# Patient Record
Sex: MALE | Race: WHITE | NOT HISPANIC OR LATINO | Employment: FULL TIME | URBAN - METROPOLITAN AREA
[De-identification: names, ages, dates, MRNs, and addresses within clinical notes are randomized per-mention and may not be internally consistent; named-entity substitution may affect disease eponyms.]

---

## 2024-02-12 ENCOUNTER — LAB REQUISITION (OUTPATIENT)
Dept: LAB | Facility: HOSPITAL | Age: 24
End: 2024-02-12

## 2024-02-12 DIAGNOSIS — Z20.2 CONTACT WITH AND (SUSPECTED) EXPOSURE TO INFECTIONS WITH A PREDOMINANTLY SEXUAL MODE OF TRANSMISSION: ICD-10-CM

## 2024-09-11 ENCOUNTER — OFFICE VISIT (OUTPATIENT)
Dept: URGENT CARE | Facility: URGENT CARE | Age: 24
End: 2024-09-11
Payer: MEDICAID

## 2024-09-11 VITALS
DIASTOLIC BLOOD PRESSURE: 79 MMHG | BODY MASS INDEX: 21.91 KG/M2 | OXYGEN SATURATION: 96 % | TEMPERATURE: 98.9 F | HEART RATE: 97 BPM | SYSTOLIC BLOOD PRESSURE: 125 MMHG | WEIGHT: 166.01 LBS | RESPIRATION RATE: 18 BRPM

## 2024-09-11 DIAGNOSIS — R51.9 NONINTRACTABLE HEADACHE, UNSPECIFIED CHRONICITY PATTERN, UNSPECIFIED HEADACHE TYPE: ICD-10-CM

## 2024-09-11 DIAGNOSIS — R68.83 CHILLS: ICD-10-CM

## 2024-09-11 DIAGNOSIS — J06.9 VIRAL URI: ICD-10-CM

## 2024-09-11 DIAGNOSIS — J39.9 UPPER RESPIRATORY DISEASE: Primary | ICD-10-CM

## 2024-09-11 LAB — POC SARS-COV-2 AG BINAX: NORMAL

## 2024-09-11 RX ORDER — IBUPROFEN 600 MG/1
600 TABLET ORAL EVERY 6 HOURS PRN
Qty: 30 TABLET | Refills: 0 | Status: SHIPPED | OUTPATIENT
Start: 2024-09-11 | End: 2024-09-21

## 2024-09-11 RX ORDER — CEFDINIR 300 MG/1
300 CAPSULE ORAL 2 TIMES DAILY
Qty: 20 CAPSULE | Refills: 0 | Status: SHIPPED | OUTPATIENT
Start: 2024-09-11 | End: 2024-09-21

## 2024-09-11 RX ORDER — CETIRIZINE HYDROCHLORIDE, PSEUDOEPHEDRINE HYDROCHLORIDE 5; 120 MG/1; MG/1
1 TABLET, FILM COATED, EXTENDED RELEASE ORAL 2 TIMES DAILY
Qty: 20 TABLET | Refills: 0 | Status: SHIPPED | OUTPATIENT
Start: 2024-09-11 | End: 2024-09-21

## 2024-09-11 ASSESSMENT — ENCOUNTER SYMPTOMS
HEADACHES: 1
CHILLS: 1
RHINORRHEA: 1
MYALGIAS: 1
FATIGUE: 1

## 2024-09-11 NOTE — PROGRESS NOTES
Subjective   Patient ID: Markie Issa is a 23 y.o. male. They present today with a chief complaint of Illness (1 week), Nausea, Poor Appetite, hot/cold flashes, and Fatigue.    History of Present Illness  23-year-old male complaints of  initally sore throat (last 1 days ) with bodyaches chills headache s, fatigued and decrease of appetite symptoms started 1 week ago have not improved    Has been taking fluids and well using over-the-counter Motrin or Tylenol  No abdominal pain no nausea vomiting diarrhea no chest pain no shortness of breath      Illness  Associated symptoms: fatigue, headaches, myalgias and rhinorrhea    Fatigue  Associated symptoms: fatigue, headaches, myalgias and rhinorrhea        Past Medical History  Allergies as of 09/11/2024    (No Known Allergies)       (Not in a hospital admission)       Past Medical History:   Diagnosis Date    Acute upper respiratory infection, unspecified 02/18/2014    Viral URI    Pain in left ankle and joints of left foot 09/03/2015    Left ankle pain    Personal history of diseases of the skin and subcutaneous tissue 11/26/2013    History of contact dermatitis    Personal history of other diseases of the respiratory system 11/02/2020    History of sore throat    Personal history of other diseases of the respiratory system 11/05/2020    History of sore throat    Personal history of other diseases of the respiratory system 02/16/2015    History of acute pharyngitis    Personal history of other diseases of the respiratory system 02/18/2014    History of acute pharyngitis    Personal history of other mental and behavioral disorders     History of attention deficit hyperactivity disorder (ADHD)    Personal history of other specified conditions 10/24/2020    History of fatigue    Unspecified injury of left ankle, initial encounter 09/03/2015    Left ankle injury       Past Surgical History:   Procedure Laterality Date    OTHER SURGICAL HISTORY  08/05/2020    No history  of surgery        reports that he has been smoking cigarettes. He does not have any smokeless tobacco history on file. He reports that he does not use drugs.    Review of Systems  Review of Systems   Constitutional:  Positive for chills and fatigue.   HENT:  Positive for rhinorrhea.    Musculoskeletal:  Positive for myalgias.   Neurological:  Positive for headaches.                                  Objective    Vitals:    09/11/24 1250   BP: 125/79   BP Location: Left arm   Patient Position: Sitting   BP Cuff Size: Adult   Pulse: 97   Resp: 18   Temp: 37.2 °C (98.9 °F)   TempSrc: Oral   SpO2: 96%   Weight: 75.3 kg (166 lb 0.1 oz)     No LMP for male patient.    Physical Exam  Vitals and nursing note reviewed.   Constitutional:       Appearance: Normal appearance.   HENT:      Head: Normocephalic and atraumatic.      Right Ear: Tympanic membrane, ear canal and external ear normal.      Left Ear: Tympanic membrane, ear canal and external ear normal.      Nose: Nose normal.      Mouth/Throat:      Mouth: Mucous membranes are dry.   Eyes:      Extraocular Movements: Extraocular movements intact.      Conjunctiva/sclera: Conjunctivae normal.      Pupils: Pupils are equal, round, and reactive to light.   Cardiovascular:      Rate and Rhythm: Normal rate and regular rhythm.   Pulmonary:      Effort: Pulmonary effort is normal.      Breath sounds: Normal breath sounds.   Abdominal:      Palpations: Abdomen is soft.   Musculoskeletal:         General: Normal range of motion.      Cervical back: Normal range of motion and neck supple.   Skin:     General: Skin is warm and dry.      Capillary Refill: Capillary refill takes less than 2 seconds.   Neurological:      General: No focal deficit present.      Mental Status: He is alert.         Procedures    Point of Care Test & Imaging Results from this visit  Results for orders placed or performed in visit on 09/03/22   C. Trachomatis / N. Gonorrhoeae, Amplified Detection   Result  "Value Ref Range    Neisseria gonorrhea,Amplified NEGATIVE Negative    Chlamydia trachomatis, Amplified NEGATIVE Negative   Urinalysis with Culture if Indicated   Result Value Ref Range    Color, Urine YELLOW STRAW,YELLOW    Appearance, Urine CLEAR CLEAR    Specific Gravity, Urine 1.015 1.005 - 1.035    pH, Urine 7.0 5.0 - 8.0    Protein, Urine NEGATIVE NEGATIVE mg/dL    Glucose, Urine 100 (A) NEGATIVE mg/dL    Blood, Urine MODERATE (2+) (A) NEGATIVE    Ketones, Urine NEGATIVE NEGATIVE mg/dL    Bilirubin, Urine NEGATIVE NEGATIVE    Urobilinogen, Urine <2.0 0.0 - 1.9 mg/dL    Nitrite, Urine NEGATIVE NEGATIVE    Leukocyte Esterase, Urine NEGATIVE NEGATIVE   Urinalysis Microscopic   Result Value Ref Range    WBC, Urine <1 0 - 5 /HPF    RBC, Urine 3 0 - 5 /HPF     No results found.    Diagnostic study results (if any) were reviewed by [facility].    Assessment/Plan   Allergies, medications, history, and pertinent labs/EKGs/Imaging reviewed by Kami Rm PA-C.     Medical Decision Making  COVID viral URI cephalgia    Patient with symptoms now for the last week did have complaint of slight \"scratchy throat\" the first day that is since resolved no rashes or fevers but profound chills body aches and fatigue    Will check a rapid COVID  If negative will treat as an upper respiratory infection    Stressed to the patient to push p.o. fluids get rechecked if not improving    Orders and Diagnoses  There are no diagnoses linked to this encounter.    Medical Admin Record      Follow Up Instructions  No follow-ups on file.    Patient disposition: Home    Electronically signed by [facility]  1:15 PM      "

## 2024-09-11 NOTE — PATIENT INSTRUCTIONS
Stressed to the patient to push p.o. fluids water juices  To take over-the-counter Motrin or Tylenol in addition to the medication prescribed for symptoms

## 2024-09-11 NOTE — LETTER
September 11, 2024     Patient: Markie Issa   YOB: 2000   Date of Visit: 9/11/2024       To Whom It May Concern:    Markie Issa was seen in my clinic on 9/11/2024 at 12:30 pm. Please excuse Markie for his absence from work for today and tomorrow     If you have any questions or concerns, please don't hesitate to call.         Sincerely,         Kami Rm PA-C        CC: No Recipients

## 2024-11-01 ENCOUNTER — APPOINTMENT (OUTPATIENT)
Dept: URGENT CARE | Facility: URGENT CARE | Age: 24
End: 2024-11-01
Payer: MEDICAID

## 2024-11-03 ENCOUNTER — OFFICE VISIT (OUTPATIENT)
Dept: URGENT CARE | Facility: URGENT CARE | Age: 24
End: 2024-11-03
Payer: MEDICAID

## 2024-11-03 VITALS
WEIGHT: 169.75 LBS | RESPIRATION RATE: 16 BRPM | TEMPERATURE: 98.4 F | HEART RATE: 75 BPM | SYSTOLIC BLOOD PRESSURE: 114 MMHG | DIASTOLIC BLOOD PRESSURE: 78 MMHG | OXYGEN SATURATION: 96 %

## 2024-11-03 DIAGNOSIS — J98.01 BRONCHIAL SPASM: ICD-10-CM

## 2024-11-03 DIAGNOSIS — J20.9 ACUTE BRONCHITIS WITH BRONCHOSPASM: Primary | ICD-10-CM

## 2024-11-03 DIAGNOSIS — J06.9 VIRAL URI: ICD-10-CM

## 2024-11-03 PROCEDURE — 99213 OFFICE O/P EST LOW 20 MIN: CPT | Performed by: FAMILY MEDICINE

## 2024-11-03 RX ORDER — ALBUTEROL SULFATE 90 UG/1
INHALANT RESPIRATORY (INHALATION)
Qty: 6.7 G | Refills: 0 | Status: SHIPPED | OUTPATIENT
Start: 2024-11-03

## 2024-11-03 RX ORDER — PREDNISONE 20 MG/1
40 TABLET ORAL DAILY
Qty: 10 TABLET | Refills: 0 | Status: SHIPPED | OUTPATIENT
Start: 2024-11-03 | End: 2024-11-08

## 2024-11-03 RX ORDER — INHALER, ASSIST DEVICES
SPACER (EA) MISCELLANEOUS
Qty: 1 EACH | Refills: 0 | Status: SHIPPED | OUTPATIENT
Start: 2024-11-03

## 2024-11-03 ASSESSMENT — ENCOUNTER SYMPTOMS
RHINORRHEA: 1
VOMITING: 0
COUGH: 1
NAUSEA: 1
SORE THROAT: 0
SINUS PRESSURE: 1
CHILLS: 0
CHEST TIGHTNESS: 0
HEADACHES: 1
FEVER: 0
PALPITATIONS: 0
CONSTIPATION: 0
FREQUENCY: 0
DYSURIA: 0
DIARRHEA: 0
WHEEZING: 1
ABDOMINAL PAIN: 0
SHORTNESS OF BREATH: 1

## 2025-01-13 ENCOUNTER — OFFICE VISIT (OUTPATIENT)
Dept: URGENT CARE | Facility: URGENT CARE | Age: 25
End: 2025-01-13
Payer: MEDICAID

## 2025-01-13 VITALS
WEIGHT: 171.08 LBS | HEART RATE: 87 BPM | BODY MASS INDEX: 22.58 KG/M2 | TEMPERATURE: 100.3 F | RESPIRATION RATE: 20 BRPM | DIASTOLIC BLOOD PRESSURE: 74 MMHG | OXYGEN SATURATION: 97 % | SYSTOLIC BLOOD PRESSURE: 113 MMHG

## 2025-01-13 DIAGNOSIS — J10.1 INFLUENZA A: ICD-10-CM

## 2025-01-13 DIAGNOSIS — R11.0 NAUSEA: ICD-10-CM

## 2025-01-13 DIAGNOSIS — R50.9 FEVER, UNSPECIFIED FEVER CAUSE: Primary | ICD-10-CM

## 2025-01-13 LAB
POC RAPID INFLUENZA A: POSITIVE
POC RAPID INFLUENZA B: NEGATIVE
POC SARS-COV-2 AG BINAX: NORMAL

## 2025-01-13 RX ORDER — ONDANSETRON 4 MG/1
4 TABLET, ORALLY DISINTEGRATING ORAL EVERY 8 HOURS PRN
Qty: 12 TABLET | Refills: 0 | Status: SHIPPED | OUTPATIENT
Start: 2025-01-13 | End: 2025-01-17

## 2025-01-13 RX ORDER — OSELTAMIVIR PHOSPHATE 75 MG/1
75 CAPSULE ORAL 2 TIMES DAILY
Qty: 10 CAPSULE | Refills: 0 | Status: SHIPPED | OUTPATIENT
Start: 2025-01-13 | End: 2025-01-18

## 2025-01-13 NOTE — PATIENT INSTRUCTIONS
Acute fever due to Influenza A- Rapid Influenza A positive, Influenza B negative, negative covid. Start Tamiflu 75 mg twice a day x 5 days; take with food.   Encouraged hydration with Pedialyte or water.    1. **Stay Home**: Individuals who are sick should stay home for at least 24 hours after their fever has subsided without the use of fever-reducing medications.  2. **Avoid Close Contact**: Sick individuals should avoid close contact with others to prevent spreading the virus.  3. **Hygiene Practices**: Frequent handwashing, using hand sanitizers, and covering coughs and sneezes are encouraged to reduce transmission.  4. **Vaccination**: Annual flu vaccines are recommended for most individuals to help prevent illness.    Fever - Use Ibuprofen or Tylenol or Cool Wet Compress to axilla or neck or lukewarm bath getting head wet or cold fluids such as Pedialyte Popsicles to bring down your fever and help with body aches and pains.    If symptoms not improving or worsening, follow up with PCP. Go to ER if headache, neck stiffness, weakness with fever 103 to rule out meningitis.

## 2025-01-13 NOTE — LETTER
January 13, 2025     Patient: Markie Issa   YOB: 2000   Date of Visit: 1/13/2025       To Whom It May Concern:    Markie Issa was seen in my clinic on 1/13/2025 at 10:45 am. Please excuse Markie for his absence to work, he may return on 1/17/2025.      If you have any questions or concerns, please don't hesitate to call.         Sincerely,         Mague Maynard PA-C        CC: No Recipients

## 2025-01-13 NOTE — PROGRESS NOTES
Subjective   Patient ID: Markie Issa is a 24 y.o. male. They present today with a chief complaint of Illness (2 days started having pain in right upper abdomen vomiting loss of appetite, and chills ).    History of Present Illness    Illness  Pt reports symptoms started Saturday less than 48 hours ago with body aches, weakness, cough, congestion, nausea and nonbloody vomiting. He denies diarrhea, abdominal pain resolved.. No chest pain. He reports smoking.     Past Medical History  Allergies as of 01/13/2025    (No Known Allergies)       (Not in a hospital admission)       Past Medical History:   Diagnosis Date    Acute upper respiratory infection, unspecified 02/18/2014    Viral URI    Pain in left ankle and joints of left foot 09/03/2015    Left ankle pain    Personal history of diseases of the skin and subcutaneous tissue 11/26/2013    History of contact dermatitis    Personal history of other diseases of the respiratory system 11/02/2020    History of sore throat    Personal history of other diseases of the respiratory system 11/05/2020    History of sore throat    Personal history of other diseases of the respiratory system 02/16/2015    History of acute pharyngitis    Personal history of other diseases of the respiratory system 02/18/2014    History of acute pharyngitis    Personal history of other mental and behavioral disorders     History of attention deficit hyperactivity disorder (ADHD)    Personal history of other specified conditions 10/24/2020    History of fatigue    Unspecified injury of left ankle, initial encounter 09/03/2015    Left ankle injury       Past Surgical History:   Procedure Laterality Date    OTHER SURGICAL HISTORY  08/05/2020    No history of surgery        reports that he has been smoking cigarettes. He does not have any smokeless tobacco history on file. He reports that he does not use drugs.    Review of Systems  Review of Systems                               Objective     Vitals:    01/13/25 0955   BP: 113/74   BP Location: Left arm   Patient Position: Sitting   BP Cuff Size: Adult   Pulse: 87   Resp: 20   Temp: (!) 37.9 °C (100.3 °F)   TempSrc: Oral   SpO2: 97%   Weight: 77.6 kg (171 lb 1.2 oz)     No LMP for male patient.    Physical Exam  Vitals and nursing note reviewed.   Constitutional:       Appearance: He is ill-appearing.      Comments: Strong tobacco odor   HENT:      Head: Normocephalic and atraumatic.      Right Ear: Tympanic membrane normal.      Left Ear: Tympanic membrane normal.      Nose: Congestion and rhinorrhea present.      Mouth/Throat:      Mouth: Mucous membranes are moist.      Pharynx: Posterior oropharyngeal erythema present. No oropharyngeal exudate.   Eyes:      Extraocular Movements: Extraocular movements intact.      Conjunctiva/sclera: Conjunctivae normal.      Pupils: Pupils are equal, round, and reactive to light.   Cardiovascular:      Rate and Rhythm: Normal rate and regular rhythm.      Heart sounds: No murmur heard.  Pulmonary:      Effort: Pulmonary effort is normal. No respiratory distress.      Breath sounds: No wheezing.      Comments: Mildly diminished breath sounds  Musculoskeletal:      Cervical back: Normal range of motion and neck supple.   Neurological:      Mental Status: He is alert.         Procedures    Point of Care Test & Imaging Results from this visit  Results for orders placed or performed in visit on 01/13/25   POCT Influenza A/B manually resulted   Result Value Ref Range    POC Rapid Influenza A Positive (A) Negative    POC Rapid Influenza B Negative Negative   POCT Covid-19 Rapid Antigen   Result Value Ref Range    POC JEIMY-COV-2 AG  Presumptive negative test for SARS-CoV-2 (no antigen detected)     Presumptive negative test for SARS-CoV-2 (no antigen detected)      No results found.    Diagnostic study results (if any) were reviewed by Mague Maynard PA-C.    Assessment/Plan   Allergies, medications, history, and  pertinent labs/EKGs/Imaging reviewed by Mague Maynard PA-C.     Medical Decision Making  25 yo M reports symptoms started Saturday less than 48 hours ago with body aches, weakness, cough, congestion, nausea and nonbloody vomiting. He denies diarrhea, abdominal pain resolved.. Reviewed vitals T 100.3. Exam remarkable for tired appearing, nasal congestion, rhinorrhea, pharyngeal erythema, mildly diminished breath sounds w/o distress.    Discussed Acute fever due to Influenza A- Rapid Influenza A positive, Influenza B negative, negative covid. Start Tamiflu 75 mg twice a day x 5 days; take with food.   Encouraged hydration with Pedialyte or water. Strict hand hygiene advised.     Fever -alternate Ibuprofen 400-600 mg or Tylenol 500mg every 6hr (do not exceed 4000 mg of tylenol and 2000mg of ibuprofen from all sources) or Cool Wet Compress to axilla or neck or lukewarm bath getting head wet or cold fluids such as Pedialyte Popsicles to bring down your fever and help with body aches and pains.    If symptoms not improving or worsening, follow up with PCP. Go to ER if fever 103 with headache, neck stiffness, weakness to rule out meningitis, chest pain or difficulty breathing or dizziness.      Orders and Diagnoses  Diagnoses and all orders for this visit:  Fever, unspecified fever cause  -     POCT Influenza A/B manually resulted  -     POCT Covid-19 Rapid Antigen  -     oseltamivir (Tamiflu) 75 mg capsule; Take 1 capsule (75 mg) by mouth 2 times a day for 5 days.  Influenza A  -     oseltamivir (Tamiflu) 75 mg capsule; Take 1 capsule (75 mg) by mouth 2 times a day for 5 days.  Nausea  -     ondansetron ODT (Zofran-ODT) 4 mg disintegrating tablet; Dissolve 1 tablet (4 mg) in the mouth every 8 hours if needed for nausea or vomiting for up to 4 days.      Medical Admin Record      Patient disposition: Home    Electronically signed by Mague Maynard PA-C  7:46 PM